# Patient Record
Sex: FEMALE | Race: WHITE | Employment: OTHER | URBAN - METROPOLITAN AREA
[De-identification: names, ages, dates, MRNs, and addresses within clinical notes are randomized per-mention and may not be internally consistent; named-entity substitution may affect disease eponyms.]

---

## 2017-07-29 ENCOUNTER — OFFICE VISIT (OUTPATIENT)
Dept: FAMILY MEDICINE CLINIC | Facility: CLINIC | Age: 27
End: 2017-07-29

## 2017-07-29 VITALS
RESPIRATION RATE: 16 BRPM | HEIGHT: 70.5 IN | HEART RATE: 70 BPM | OXYGEN SATURATION: 98 % | TEMPERATURE: 98 F | DIASTOLIC BLOOD PRESSURE: 62 MMHG | WEIGHT: 138 LBS | BODY MASS INDEX: 19.54 KG/M2 | SYSTOLIC BLOOD PRESSURE: 100 MMHG

## 2017-07-29 DIAGNOSIS — J02.9 VIRAL PHARYNGITIS: ICD-10-CM

## 2017-07-29 DIAGNOSIS — S00.411A: Primary | ICD-10-CM

## 2017-07-29 DIAGNOSIS — H60.391: Primary | ICD-10-CM

## 2017-07-29 LAB
CONTROL LINE PRESENT WITH A CLEAR BACKGROUND (YES/NO): YES YES/NO
STREP GRP A CUL-SCR: NEGATIVE

## 2017-07-29 PROCEDURE — 99202 OFFICE O/P NEW SF 15 MIN: CPT | Performed by: NURSE PRACTITIONER

## 2017-07-29 PROCEDURE — 87880 STREP A ASSAY W/OPTIC: CPT | Performed by: NURSE PRACTITIONER

## 2017-07-29 RX ORDER — DEXTROAMPHETAMINE SACCHARATE, AMPHETAMINE ASPARTATE, DEXTROAMPHETAMINE SULFATE AND AMPHETAMINE SULFATE 5; 5; 5; 5 MG/1; MG/1; MG/1; MG/1
1 TABLET ORAL 3 TIMES DAILY
Refills: 0 | COMMUNITY
Start: 2017-05-03

## 2017-07-29 RX ORDER — ALPRAZOLAM 1 MG/1
1 TABLET ORAL AS DIRECTED
Refills: 0 | COMMUNITY
Start: 2017-05-04

## 2017-07-29 NOTE — PROGRESS NOTES
CHIEF COMPLAINT:   Patient presents with:  Ear Pain  Rash  Sore Throat        HPI:   Tyler Badillo is a 27year old female presents to clinic with complaint of sore throat. Patient has had for 2 days.  Patient reports following associated symptoms: mild /62   Pulse 70   Temp 98.4 °F (36.9 °C) (Oral)   Resp 16   Ht 70.5\"   Wt 138 lb   SpO2 98%   Breastfeeding?  No   BMI 19.52 kg/m²   GENERAL: well developed, well nourished,in no apparent distress  SKIN: no rashes,no suspicious lesions  HEAD: atraumat - Neomycin-Polymyxin-HC [id number] Otic Solution; Place 4 drops into the right ear 3 (three) times daily. Dispense: 10 mL; Refill: 0    2.  Viral pharyngitis  Discussed that due to symptoms and negative rapid strep this is most likely viral and does not r · Throat lozenges or numbing throat sprays can help reduce pain. Gargling with warm salt water will also help reduce throat pain. For this, dissolve 1/2 teaspoon of salt in 1 glass of warm water.  To help soothe a sore throat, children can sip on juice or a

## 2017-07-29 NOTE — PATIENT INSTRUCTIONS
Use ear drops as instructed. To f/u if no improvement in  3 days or sooner for worsening. Viral Pharyngitis (Sore Throat)    You (or your child, if your child is the patient) have pharyngitis (sore throat). This infection is caused by a virus.  Giorgio Loza When to seek medical advice  Call your healthcare provider right away if any of these occur:  · Fever as directed by your doctor.  For children, seek care if:  ¨ Your child is of any age and has repeated fevers above 104°F (40°C).   ¨ Your child is younger